# Patient Record
(demographics unavailable — no encounter records)

---

## 2024-10-28 NOTE — PROCEDURE
[Good Fit] : fits well [Erythema] : erythema noted [Discharge] : there is vaginal discharge [Pessary Inserted] : inserted [Pessary Washed] : washed [None] : no bleeding [Medication Review] : Medicaiton Review: Patient verbalizes understanding of risks and benefits [Fluid Management] : Fluid Management: patient verbalizes understanding 6-10 cups per day [Bowel Management] : Bowel Management: patient verbalizes understanding of daily dietary fiber intake [Bladder Training] : Bladder Training: Patient given information with verbal understanding [Refit] : refit is not needed [Erosion] : no evidence of erosion [Infection] : no evidence of infection [FreeTextEntry1] :  Ring with support # 4 [de-identified] : small area at posterior vaginal wall [FreeTextEntry3] : vaginal lubrication [FreeTextEntry8] : Reviewed daily pericare. Pt will perform pessary self care.

## 2024-10-28 NOTE — PHYSICAL EXAM
[Chaperone Present] : A chaperone was present in the examining room during all aspects of the physical examination [No Acute Distress] : in no acute distress [Well developed] : well developed [Well Nourished] : ~L well nourished [Good Hygeine] : demonstrates good hygeine [Oriented x3] : oriented to person, place, and time [Normal Memory] : ~T memory was ~L unimpaired [Normal Mood/Affect] : mood and affect are normal [Warm and Dry] : was warm and dry to touch [Normal Gait] : gait was normal [Vulvar Atrophy] : vulvar atrophy [Labia Majora] : were normal [Labia Minora] : were normal [Normal] : was normal [Erythematous] : erythema [Discharge] : a  ~M vaginal discharge was present [Scant] : scant [White] : white [Thin] : thin [No Bleeding] : there was no active vaginal bleeding [FreeTextEntry2] : Juanita [Anxiety] : patient is not anxious

## 2024-10-28 NOTE — DISCUSSION/SUMMARY
[FreeTextEntry1] : #Cystocele: - Patient doing well with Ring with support # 4. - Precautions and instructions were reviewed. - She able to do pessary self-care without difficulties.  She will continue to do this.  Follow up in 6 months for pelvic prolapse/pessary check.  If pt have any problems/concern to call office.  Pt verbalizes understanding and agrees.

## 2024-10-28 NOTE — HISTORY OF PRESENT ILLNESS
[FreeTextEntry1] : Tricia is a 64-year-old with known POP supported by a RS #4. States she is doing well with the pessary and is happy with the support. Denies any pelvic pain or discomfort.  No vaginal bleeding.  She is urinating and moving her bowels without any issues.  Tricia performs self-care nightly.

## 2024-10-28 NOTE — HISTORY OF PRESENT ILLNESS
[FreeTextEntry1] : Tricia is a 64-year-old with known POP supported by a RS #4. States she is doing well with the pessary and is happy with the support. Denies any pelvic pain or discomfort.  No vaginal bleeding.  She is urinating and moving her bowels without any issues.  Trciia performs self-care nightly.

## 2024-10-28 NOTE — PROCEDURE
[Good Fit] : fits well [Erythema] : erythema noted [Discharge] : there is vaginal discharge [Pessary Inserted] : inserted [Pessary Washed] : washed [None] : no bleeding [Medication Review] : Medicaiton Review: Patient verbalizes understanding of risks and benefits [Fluid Management] : Fluid Management: patient verbalizes understanding 6-10 cups per day [Bowel Management] : Bowel Management: patient verbalizes understanding of daily dietary fiber intake [Bladder Training] : Bladder Training: Patient given information with verbal understanding [Refit] : refit is not needed [Erosion] : no evidence of erosion [Infection] : no evidence of infection [FreeTextEntry1] :  Ring with support # 4 [de-identified] : small area at posterior vaginal wall [FreeTextEntry3] : vaginal lubrication [FreeTextEntry8] : Reviewed daily pericare. Pt will perform pessary self care.

## 2025-04-14 NOTE — DISCUSSION/SUMMARY
[FreeTextEntry1] : #POP - Patient doing well with Ring with support # 4. - Precautions and instructions were reviewed. - She able to do pessary self-care without difficulties.  She will continue to do this.  Follow up in 6 months for pelvic prolapse/pessary check.  If pt have any problems/concern to call office.  Pt verbalizes understanding and agrees.

## 2025-04-14 NOTE — PHYSICAL EXAM
[No Acute Distress] : in no acute distress [Well developed] : well developed [Well Nourished] : ~L well nourished [Good Hygeine] : demonstrates good hygeine [Oriented x3] : oriented to person, place, and time [Normal Memory] : ~T memory was ~L unimpaired [Normal Mood/Affect] : mood and affect are normal [Warm and Dry] : was warm and dry to touch [Normal Gait] : gait was normal [Vulvar Atrophy] : vulvar atrophy [Labia Majora] : were normal [Labia Minora] : were normal [Normal] : was normal [Erythematous] : erythema [Discharge] : a  ~M vaginal discharge was present [Scant] : scant [White] : white [Thin] : thin [No Bleeding] : there was no active vaginal bleeding [FreeTextEntry2] : Juanita [Anxiety] : patient is not anxious

## 2025-04-14 NOTE — PROCEDURE
[Good Fit] : fits well [Erythema] : erythema noted [Discharge] : there is vaginal discharge [Pessary Inserted] : inserted [Pessary Washed] : washed [None] : no bleeding [Medication Review] : Medicaiton Review: Patient verbalizes understanding of risks and benefits [Fluid Management] : Fluid Management: patient verbalizes understanding 6-10 cups per day [Bowel Management] : Bowel Management: patient verbalizes understanding of daily dietary fiber intake [Bladder Training] : Bladder Training: Patient given information with verbal understanding [Refit] : refit is not needed [Erosion] : no evidence of erosion [Infection] : no evidence of infection [FreeTextEntry1] :  Ring with support # 4 [de-identified] : small area at posterior vaginal wall [FreeTextEntry3] : vaginal lubrication [FreeTextEntry8] : Reviewed daily pericare. Pt will perform pessary self care.

## 2025-04-14 NOTE — PROCEDURE
[Good Fit] : fits well [Erythema] : erythema noted [Discharge] : there is vaginal discharge [Pessary Inserted] : inserted [Pessary Washed] : washed [None] : no bleeding [Medication Review] : Medicaiton Review: Patient verbalizes understanding of risks and benefits [Fluid Management] : Fluid Management: patient verbalizes understanding 6-10 cups per day [Bowel Management] : Bowel Management: patient verbalizes understanding of daily dietary fiber intake [Bladder Training] : Bladder Training: Patient given information with verbal understanding [Refit] : refit is not needed [Erosion] : no evidence of erosion [Infection] : no evidence of infection [FreeTextEntry1] :  Ring with support # 4 [de-identified] : small area at posterior vaginal wall [FreeTextEntry3] : vaginal lubrication [FreeTextEntry8] : Reviewed daily pericare. Pt will perform pessary self care.